# Patient Record
Sex: FEMALE | Race: WHITE | Employment: FULL TIME | URBAN - METROPOLITAN AREA
[De-identification: names, ages, dates, MRNs, and addresses within clinical notes are randomized per-mention and may not be internally consistent; named-entity substitution may affect disease eponyms.]

---

## 2017-02-13 ENCOUNTER — OFFICE VISIT (OUTPATIENT)
Dept: URGENT CARE | Facility: CLINIC | Age: 52
End: 2017-02-13
Payer: COMMERCIAL

## 2017-02-13 VITALS
HEART RATE: 54 BPM | BODY MASS INDEX: 23.23 KG/M2 | WEIGHT: 148 LBS | DIASTOLIC BLOOD PRESSURE: 68 MMHG | RESPIRATION RATE: 12 BRPM | TEMPERATURE: 98.1 F | OXYGEN SATURATION: 98 % | HEIGHT: 67 IN | SYSTOLIC BLOOD PRESSURE: 116 MMHG

## 2017-02-13 DIAGNOSIS — H65.93 MIDDLE EAR EFFUSION, BILATERAL: ICD-10-CM

## 2017-02-13 PROCEDURE — 99202 OFFICE O/P NEW SF 15 MIN: CPT | Performed by: NURSE PRACTITIONER

## 2017-02-13 RX ORDER — ESTRADIOL 0.05 MG/D
1 PATCH TRANSDERMAL
COMMUNITY
End: 2018-01-29

## 2017-02-13 ASSESSMENT — ENCOUNTER SYMPTOMS
FEVER: 0
WEAKNESS: 0
COUGH: 0
DIAPHORESIS: 0
HEADACHES: 0
DIZZINESS: 0
CHILLS: 0

## 2017-02-13 NOTE — MR AVS SNAPSHOT
"Lenka Moreno   2017 4:30 PM   Office Visit   MRN: [id number]    Department:  [facility]   Dept Phone:  [phone]    Description:  Female : 1965   Provider:  GAIL Lugo           Reason for Visit     Ear Fullness and pressure       Allergies as of 2017     No Known Allergies      You were diagnosed with     Middle ear effusion, bilateral   [[id number]]         Vital Signs     Blood Pressure Pulse Temperature Respirations Height Weight    116/68 mmHg 54 36.7 °C (98.1 °F) 12 1.702 m (5' 7\") 67.132 kg (148 lb)    Body Mass Index Oxygen Saturation Breastfeeding?             23.17 kg/m2 98% No         Basic Information     Date Of Birth Sex Race Ethnicity Preferred Language    1965 Female White Unknown English      Health Maintenance     Patient has no pending health maintenance at this time      Current Immunizations     No immunizations on file.      Below and/or attached are the medications your provider expects you to take. Review all of your home medications and newly ordered medications with your provider and/or pharmacist. Follow medication instructions as directed by your provider and/or pharmacist. Please keep your medication list with you and share with your provider. Update the information when medications are discontinued, doses are changed, or new medications (including over-the-counter products) are added; and carry medication information at all times in the event of emergency situations     Allergies:  No Known Allergies          Medications  Valid as of: 2017 -  5:25 PM    Generic Name Brand Name Tablet Size Instructions for use    Beclomethasone Dipropionate   Inhale  by mouth.        Estradiol (PATCH WEEKLY) CLIMARA 0.05 MG/24HR Apply 1 Patch to skin as directed every 7 days.        .                 Medicines prescribed today were sent to:     [id number] - [place], [place] - [address] [facility] AT [facility] " [address]    [address] [facility] [address]    Phone: [phone] Fax: [phone]    Open 24 Hours?: No      Medication refill instructions:       If your prescription bottle indicates you have medication refills left, it is not necessary to call your provider’s office. Please contact your pharmacy and they will refill your medication.    If your prescription bottle indicates you do not have any refills left, you may request refills at any time through one of the following ways: The online Accruit system (except Urgent Care), by calling your provider’s office, or by asking your pharmacy to contact your provider’s office with a refill request. Medication refills are processed only during regular business hours and may not be available until the next business day. Your provider may request additional information or to have a follow-up visit with you prior to refilling your medication.   *Please Note: Medication refills are assigned a new Rx number when refilled electronically. Your pharmacy may indicate that no refills were authorized even though a new prescription for the same medication is available at the pharmacy. Please request the medicine by name with the pharmacy before contacting your provider for a refill.           Accruit Access Code: Activation code not generated  Current Accruit Status: Active

## 2017-02-14 NOTE — PROGRESS NOTES
"Subjective:      Lenka Moreno is a 51 y.o. female who presents with Ear Fullness            Ear Fullness  Pertinent negatives include no chills, congestion, coughing, diaphoresis, fever, headaches, rash or weakness.   Patient comes in today with a 10 day history of intermittent ear fullness and otalgia.  She denies any nasal congestion, sore throat, or cough.  She has not taken any medication to treat the symptoms.  Inverting the head seems to relieve the symptoms temporarily.  No prior history of similar symptoms.      Review of Systems   Constitutional: Negative for fever, chills, malaise/fatigue and diaphoresis.   HENT: Positive for ear pain. Negative for congestion, ear discharge, hearing loss and tinnitus.    Respiratory: Negative for cough.    Skin: Negative for rash.   Neurological: Negative for dizziness, weakness and headaches.     Medications, Allergies, and current problem list reviewed today in Epic     Objective:     /68 mmHg  Pulse 54  Temp(Src) 36.7 °C (98.1 °F)  Resp 12  Ht 1.702 m (5' 7\")  Wt 67.132 kg (148 lb)  BMI 23.17 kg/m2  SpO2 98%  Breastfeeding? No     Physical Exam   Constitutional: She is oriented to person, place, and time. She appears well-developed and well-nourished. No distress.   HENT:   Head: Normocephalic.   Nose: Nose normal.   Mouth/Throat: Oropharynx is clear and moist. No oropharyngeal exudate.   Bilateral clear middle ear effusion with no erythema, purulence, retraction, or bulge.  No pinna or tragus tenderness.  No mastoid swelling or tenderness.   Eyes: Conjunctivae are normal. Pupils are equal, round, and reactive to light. Right eye exhibits no discharge. Left eye exhibits no discharge. No scleral icterus.   Neck: Neck supple. No JVD present. No tracheal deviation present. No thyromegaly present.   Cardiovascular: Normal rate, regular rhythm and normal heart sounds.  Exam reveals no gallop and no friction rub.    No murmur heard.  Pulmonary/Chest: Effort " normal and breath sounds normal. No stridor. No respiratory distress. She has no wheezes. She has no rales. She exhibits no tenderness.   Lymphadenopathy:     She has no cervical adenopathy.   Neurological: She is alert and oriented to person, place, and time.   Skin: Skin is warm and dry. No rash noted. She is not diaphoretic. No erythema.   Vitals reviewed.              Assessment/Plan:     1. Middle ear effusion, bilateral    Discussed exam findings with patient.  Trial Sudafed for 5-7 days and Flonase for 2-3 weeks.  RTC or see PCP in 3-4 weeks for any persistent symptoms or sooner for worsening symptoms.  Patient verbalized understanding of and agreed with plan of care.

## 2017-03-09 ENCOUNTER — RX ONLY (OUTPATIENT)
Age: 52
Setting detail: RX ONLY
End: 2017-03-09

## 2017-04-16 ENCOUNTER — OCCUPATIONAL MEDICINE (OUTPATIENT)
Dept: URGENT CARE | Facility: CLINIC | Age: 52
End: 2017-04-16
Payer: COMMERCIAL

## 2017-04-16 VITALS
DIASTOLIC BLOOD PRESSURE: 80 MMHG | SYSTOLIC BLOOD PRESSURE: 112 MMHG | OXYGEN SATURATION: 99 % | HEART RATE: 50 BPM | WEIGHT: 148 LBS | HEIGHT: 67 IN | BODY MASS INDEX: 23.23 KG/M2 | TEMPERATURE: 98.1 F | RESPIRATION RATE: 16 BRPM

## 2017-04-16 DIAGNOSIS — S16.1XXA NECK STRAIN, INITIAL ENCOUNTER: ICD-10-CM
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
## 2017-04-16 DIAGNOSIS — S70.02XA CONTUSION OF LEFT HIP, INITIAL ENCOUNTER: ICD-10-CM

## 2017-04-16 DIAGNOSIS — M54.2 NECK PAIN ON RIGHT SIDE: ICD-10-CM

## 2017-04-16 PROCEDURE — 99214 OFFICE O/P EST MOD 30 MIN: CPT | Mod: 29 | Performed by: NURSE PRACTITIONER

## 2017-04-16 ASSESSMENT — ENCOUNTER SYMPTOMS
WEAKNESS: 0
FEVER: 0
SENSORY CHANGE: 0
FOCAL WEAKNESS: 0
TINGLING: 0
NECK PAIN: 1
DIAPHORESIS: 0
FALLS: 0
CHILLS: 0

## 2017-04-16 ASSESSMENT — PAIN SCALES - GENERAL: PAINLEVEL: 3=SLIGHT PAIN
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
## 2018-01-29 ENCOUNTER — OFFICE VISIT (OUTPATIENT)
Dept: MEDICAL GROUP | Facility: PHYSICIAN GROUP | Age: 53
End: 2018-01-29
Payer: COMMERCIAL

## 2018-01-29 VITALS
OXYGEN SATURATION: 97 % | HEIGHT: 67 IN | BODY MASS INDEX: 24.8 KG/M2 | DIASTOLIC BLOOD PRESSURE: 78 MMHG | WEIGHT: 158 LBS | SYSTOLIC BLOOD PRESSURE: 110 MMHG | RESPIRATION RATE: 16 BRPM | HEART RATE: 57 BPM | TEMPERATURE: 98.2 F

## 2018-01-29 DIAGNOSIS — J45.40 MODERATE PERSISTENT ASTHMA WITHOUT COMPLICATION: ICD-10-CM

## 2018-01-29 DIAGNOSIS — Z12.11 COLON CANCER SCREENING: ICD-10-CM

## 2018-01-29 DIAGNOSIS — Z00.00 ANNUAL PHYSICAL EXAM: ICD-10-CM

## 2018-01-29 DIAGNOSIS — Z12.31 ENCOUNTER FOR SCREENING MAMMOGRAM FOR BREAST CANCER: ICD-10-CM

## 2018-01-29 PROCEDURE — 99203 OFFICE O/P NEW LOW 30 MIN: CPT | Performed by: NURSE PRACTITIONER

## 2018-01-29 RX ORDER — ESTRADIOL 0.5 MG/1
TABLET ORAL
Refills: 0 | COMMUNITY
Start: 2018-01-16 | End: 2018-04-30 | Stop reason: SDUPTHER

## 2018-01-29 RX ORDER — ALBUTEROL SULFATE 90 UG/1
1-2 AEROSOL, METERED RESPIRATORY (INHALATION) EVERY 4 HOURS PRN
Qty: 1 INHALER | Refills: 3 | Status: SHIPPED | OUTPATIENT
Start: 2018-01-29 | End: 2019-04-15 | Stop reason: SDUPTHER

## 2018-01-29 ASSESSMENT — PATIENT HEALTH QUESTIONNAIRE - PHQ9: CLINICAL INTERPRETATION OF PHQ2 SCORE: 0

## 2018-01-29 NOTE — PROGRESS NOTES
"Subjective:   Lenka Moreno is a 52 y.o. female here today for ***    No problem-specific Assessment & Plan notes found for this encounter.     ***  Health Maintenance Due   Topic Date Due   • PAP SMEAR  12/05/1984   • COLONOSCOPY  12/05/2013   • MAMMOGRAM  08/17/2017   • IMM INFLUENZA (1) 09/01/2017       Current medicines (including changes today)  Current Outpatient Prescriptions   Medication Sig Dispense Refill   • Albuterol (VENTOLIN INH) Inhale  by mouth.     • estradiol (ESTRACE) 0.5 MG tablet TK 1 T PO  QD  0   • ibuprofen (MOTRIN) 100 MG/5ML Suspension Take 10 mg/kg by mouth every 6 hours as needed.     • Beclomethasone Dipropionate (QVAR INH) Inhale  by mouth.       No current facility-administered medications for this visit.      She  has no past medical history of Breast cancer (CMS-[facility]).    Social History     Social History   • Marital status:      Spouse name: N/A   • Number of children: N/A   • Years of education: N/A     Occupational History   • Not on file.     Social History Main Topics   • Smoking status: Never Smoker   • Smokeless tobacco: Never Used   • Alcohol use 4.2 oz/week     7 Glasses of wine per week      Comment: one glass of wine a night    • Drug use: No   • Sexual activity: No     Other Topics Concern   • Not on file     Social History Narrative   • No narrative on file       ROS           Objective:     Pulse (!) 57, temperature 36.8 °C (98.2 °F), resp. rate 16, height 1.702 m (5' 7.01\"), weight 71.7 kg (158 lb), SpO2 97 %. Body mass index is 24.74 kg/m². ***    Physical Exam      Assessment and Plan:   The following treatment plan was discussed    There are no diagnoses linked to this encounter.    Followup: No Follow-up on file.         "

## 2018-01-30 PROBLEM — J45.40 MODERATE PERSISTENT ASTHMA WITHOUT COMPLICATION: Status: ACTIVE | Noted: 2018-01-30

## 2018-01-30 ASSESSMENT — ENCOUNTER SYMPTOMS
SORE THROAT: 0
DIARRHEA: 0
BLURRED VISION: 0
PALPITATIONS: 0
BLOOD IN STOOL: 0
VOMITING: 0
DIZZINESS: 0
ABDOMINAL PAIN: 0
DEPRESSION: 0
HEMOPTYSIS: 0
WHEEZING: 0
DIAPHORESIS: 0
SINUS PAIN: 0
DYSPNEA ON EXERTION: 0
HEADACHES: 0
CHILLS: 0
FEVER: 0
SPUTUM PRODUCTION: 0
SHORTNESS OF BREATH: 1
CONSTIPATION: 0
COUGH: 1
DIFFICULTY BREATHING: 0
NAUSEA: 0
CHEST TIGHTNESS: 1
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
## 2018-02-06 ENCOUNTER — HOSPITAL ENCOUNTER (OUTPATIENT)
Facility: MEDICAL CENTER | Age: 53
End: 2018-02-06
Attending: NURSE PRACTITIONER
Payer: COMMERCIAL

## 2018-02-06 PROCEDURE — 82274 ASSAY TEST FOR BLOOD FECAL: CPT

## 2018-02-08 ENCOUNTER — HOSPITAL ENCOUNTER (OUTPATIENT)
Dept: LAB | Facility: MEDICAL CENTER | Age: 53
End: 2018-02-08
Attending: NURSE PRACTITIONER
Payer: COMMERCIAL

## 2018-02-08 DIAGNOSIS — Z00.00 ANNUAL PHYSICAL EXAM: ICD-10-CM

## 2018-02-08 LAB
25(OH)D3 SERPL-MCNC: 14 NG/ML (ref 30–100)
ALBUMIN SERPL BCP-MCNC: 4 G/DL (ref 3.2–4.9)
ALBUMIN/GLOB SERPL: 1.5 G/DL
ALP SERPL-CCNC: 51 U/L (ref 30–99)
ALT SERPL-CCNC: 11 U/L (ref 2–50)
ANION GAP SERPL CALC-SCNC: 8 MMOL/L (ref 0–11.9)
AST SERPL-CCNC: 15 U/L (ref 12–45)
BILIRUB SERPL-MCNC: 0.4 MG/DL (ref 0.1–1.5)
BUN SERPL-MCNC: 13 MG/DL (ref 8–22)
CALCIUM SERPL-MCNC: 9.1 MG/DL (ref 8.5–10.5)
CHLORIDE SERPL-SCNC: 104 MMOL/L (ref 96–112)
CHOLEST SERPL-MCNC: 208 MG/DL (ref 100–199)
CO2 SERPL-SCNC: 27 MMOL/L (ref 20–33)
CREAT SERPL-MCNC: 0.86 MG/DL (ref 0.5–1.4)
ERYTHROCYTE [DISTWIDTH] IN BLOOD BY AUTOMATED COUNT: 44 FL (ref 35.9–50)
EST. AVERAGE GLUCOSE BLD GHB EST-MCNC: 108 MG/DL
GLOBULIN SER CALC-MCNC: 2.6 G/DL (ref 1.9–3.5)
GLUCOSE SERPL-MCNC: 62 MG/DL (ref 65–99)
HBA1C MFR BLD: 5.4 % (ref 0–5.6)
HCT VFR BLD AUTO: 41.6 % (ref 37–47)
HDLC SERPL-MCNC: 80 MG/DL
HGB BLD-MCNC: 12.9 G/DL (ref 12–16)
LDLC SERPL CALC-MCNC: 112 MG/DL
MCH RBC QN AUTO: 28.9 PG (ref 27–33)
MCHC RBC AUTO-ENTMCNC: 31 G/DL (ref 33.6–35)
MCV RBC AUTO: 93.1 FL (ref 81.4–97.8)
PLATELET # BLD AUTO: 237 K/UL (ref 164–446)
PMV BLD AUTO: 14.6 FL (ref 9–12.9)
POTASSIUM SERPL-SCNC: 4.1 MMOL/L (ref 3.6–5.5)
PROT SERPL-MCNC: 6.6 G/DL (ref 6–8.2)
RBC # BLD AUTO: 4.47 M/UL (ref 4.2–5.4)
SODIUM SERPL-SCNC: 139 MMOL/L (ref 135–145)
T4 FREE SERPL-MCNC: 0.79 NG/DL (ref 0.53–1.43)
TRIGL SERPL-MCNC: 81 MG/DL (ref 0–149)
TSH SERPL DL<=0.005 MIU/L-ACNC: 1.49 UIU/ML (ref 0.38–5.33)
WBC # BLD AUTO: 5.1 K/UL (ref 4.8–10.8)

## 2018-02-08 PROCEDURE — 84443 ASSAY THYROID STIM HORMONE: CPT

## 2018-02-08 PROCEDURE — 36415 COLL VENOUS BLD VENIPUNCTURE: CPT

## 2018-02-08 PROCEDURE — 83036 HEMOGLOBIN GLYCOSYLATED A1C: CPT

## 2018-02-08 PROCEDURE — 80053 COMPREHEN METABOLIC PANEL: CPT

## 2018-02-08 PROCEDURE — 82306 VITAMIN D 25 HYDROXY: CPT

## 2018-02-08 PROCEDURE — 85027 COMPLETE CBC AUTOMATED: CPT

## 2018-02-08 PROCEDURE — 80061 LIPID PANEL: CPT

## 2018-02-08 PROCEDURE — 84439 ASSAY OF FREE THYROXINE: CPT

## 2018-02-13 DIAGNOSIS — Z12.11 COLON CANCER SCREENING: ICD-10-CM

## 2018-02-14 LAB — HEMOCCULT STL QL IA: NEGATIVE

## 2018-02-16 ENCOUNTER — TELEPHONE (OUTPATIENT)
Dept: MEDICAL GROUP | Facility: PHYSICIAN GROUP | Age: 53
End: 2018-02-16

## 2018-02-16 NOTE — TELEPHONE ENCOUNTER
----- Message from GAIL Brown sent at 2/15/2018  7:33 PM PST -----  FIT test negative for blood. Routine screening recommended in one year. Please updated HM.    GAIL Brown,DANITZAP-C

## 2018-03-23 ENCOUNTER — TELEPHONE (OUTPATIENT)
Dept: MEDICAL GROUP | Facility: PHYSICIAN GROUP | Age: 53
End: 2018-03-23

## 2018-03-23 RX ORDER — FLUTICASONE PROPIONATE 110 UG/1
2 AEROSOL, METERED RESPIRATORY (INHALATION) 2 TIMES DAILY
Qty: 1 INHALER | Refills: 2 | Status: SHIPPED | OUTPATIENT
Start: 2018-03-23 | End: 2018-06-18 | Stop reason: SDUPTHER

## 2018-03-23 NOTE — TELEPHONE ENCOUNTER
Phone Number Called: [phone] (home)       Message: Left message for pt to notify her about the new medication.     Left Message for patient to call back: only if she has questions.

## 2018-03-23 NOTE — TELEPHONE ENCOUNTER
Pt called and stated that Gustavo told her that QVAR is no longer being made. She would like to know if she can get an alternative sent over to the pharmacy.

## 2018-03-29 ENCOUNTER — OFFICE VISIT (OUTPATIENT)
Dept: MEDICAL GROUP | Facility: PHYSICIAN GROUP | Age: 53
End: 2018-03-29
Payer: COMMERCIAL

## 2018-03-29 VITALS
WEIGHT: 153.6 LBS | DIASTOLIC BLOOD PRESSURE: 82 MMHG | TEMPERATURE: 98.1 F | HEIGHT: 67 IN | SYSTOLIC BLOOD PRESSURE: 108 MMHG | OXYGEN SATURATION: 99 % | BODY MASS INDEX: 24.11 KG/M2 | RESPIRATION RATE: 18 BRPM | HEART RATE: 61 BPM

## 2018-03-29 DIAGNOSIS — J45.50 SEVERE PERSISTENT ASTHMA WITHOUT COMPLICATION: ICD-10-CM

## 2018-03-29 DIAGNOSIS — Z23 NEED FOR VACCINATION: ICD-10-CM

## 2018-03-29 PROCEDURE — 90732 PPSV23 VACC 2 YRS+ SUBQ/IM: CPT | Performed by: NURSE PRACTITIONER

## 2018-03-29 PROCEDURE — 99213 OFFICE O/P EST LOW 20 MIN: CPT | Mod: 25 | Performed by: NURSE PRACTITIONER

## 2018-03-29 PROCEDURE — 90471 IMMUNIZATION ADMIN: CPT | Performed by: NURSE PRACTITIONER

## 2018-03-29 ASSESSMENT — ENCOUNTER SYMPTOMS
COUGH: 0
CHILLS: 0
SORE THROAT: 0
HEMOPTYSIS: 0
FEVER: 0
BLURRED VISION: 0
PALPITATIONS: 0
DIFFICULTY BREATHING: 0
CHEST TIGHTNESS: 0
SHORTNESS OF BREATH: 0
SPUTUM PRODUCTION: 0
DIAPHORESIS: 0
WHEEZING: 0
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
## 2018-04-30 ENCOUNTER — OFFICE VISIT (OUTPATIENT)
Dept: MEDICAL GROUP | Facility: PHYSICIAN GROUP | Age: 53
End: 2018-04-30
Payer: COMMERCIAL

## 2018-04-30 VITALS
SYSTOLIC BLOOD PRESSURE: 100 MMHG | BODY MASS INDEX: 24.33 KG/M2 | HEART RATE: 59 BPM | HEIGHT: 67 IN | OXYGEN SATURATION: 98 % | TEMPERATURE: 98.5 F | DIASTOLIC BLOOD PRESSURE: 60 MMHG | RESPIRATION RATE: 16 BRPM | WEIGHT: 155 LBS

## 2018-04-30 DIAGNOSIS — N95.1 POST MENOPAUSAL SYNDROME: ICD-10-CM

## 2018-04-30 DIAGNOSIS — J45.40 MODERATE PERSISTENT ASTHMA WITHOUT COMPLICATION: ICD-10-CM

## 2018-04-30 PROCEDURE — 99214 OFFICE O/P EST MOD 30 MIN: CPT | Performed by: NURSE PRACTITIONER

## 2018-04-30 RX ORDER — ESTRADIOL 0.5 MG/1
0.5 TABLET ORAL DAILY
Qty: 60 TAB | Refills: 0 | Status: SHIPPED | OUTPATIENT
Start: 2018-04-30 | End: 2018-06-27 | Stop reason: SDUPTHER

## 2018-04-30 ASSESSMENT — ENCOUNTER SYMPTOMS
DIZZINESS: 0
WEIGHT LOSS: 0
SHORTNESS OF BREATH: 0
PALPITATIONS: 0
DIAPHORESIS: 0
FEVER: 0
WEAKNESS: 0
CHILLS: 0
SPUTUM PRODUCTION: 0
COUGH: 0
WHEEZING: 0
CHEST TIGHTNESS: 0

## 2018-04-30 NOTE — ASSESSMENT & PLAN NOTE
This a chronic issue that began following hysterectomy over 10 years ago. Associated symptoms include hot flashes. Her symptoms have been controlled with HRT, which she has been taking for the past 3-4 years. She is requesting a refill on medication today. Her last mammogram was one year ago and normal.
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
## 2018-05-01 ENCOUNTER — HOSPITAL ENCOUNTER (OUTPATIENT)
Dept: RADIOLOGY | Facility: MEDICAL CENTER | Age: 53
End: 2018-05-01
Attending: NURSE PRACTITIONER
Payer: COMMERCIAL

## 2018-05-01 DIAGNOSIS — Z12.31 ENCOUNTER FOR SCREENING MAMMOGRAM FOR BREAST CANCER: ICD-10-CM

## 2018-05-01 PROCEDURE — 77067 SCR MAMMO BI INCL CAD: CPT

## 2018-05-07 ENCOUNTER — HOSPITAL ENCOUNTER (OUTPATIENT)
Dept: RADIOLOGY | Facility: MEDICAL CENTER | Age: 53
End: 2018-05-07
Attending: NURSE PRACTITIONER
Payer: COMMERCIAL

## 2018-05-07 DIAGNOSIS — R92.8 ABNORMAL MAMMOGRAM: ICD-10-CM

## 2018-05-07 PROCEDURE — 77065 DX MAMMO INCL CAD UNI: CPT | Mod: LT

## 2018-05-07 PROCEDURE — 76642 ULTRASOUND BREAST LIMITED: CPT | Mod: LT

## 2018-09-04 DIAGNOSIS — J45.50 SEVERE PERSISTENT ASTHMA WITHOUT COMPLICATION: ICD-10-CM

## 2018-09-04 RX ORDER — DEXAMETHASONE 4 MG/1
TABLET ORAL
Qty: 12 G | Refills: 0 | Status: SHIPPED | OUTPATIENT
Start: 2018-09-04 | End: 2018-10-02 | Stop reason: SDUPTHER

## 2018-09-27 DIAGNOSIS — N95.1 POST MENOPAUSAL SYNDROME: ICD-10-CM

## 2018-09-27 RX ORDER — ESTRADIOL 0.5 MG/1
TABLET ORAL
Qty: 90 TAB | Refills: 0 | Status: SHIPPED | OUTPATIENT
Start: 2018-09-27 | End: 2019-01-23 | Stop reason: SDUPTHER

## 2018-09-27 NOTE — TELEPHONE ENCOUNTER
Was the patient seen in the last year in this department? Yes    Does patient have an active prescription for medications requested? Yes    Received Request Via: Pharmacy        Last visit: 4/30/18  Last labs:2/8/18

## 2019-01-10 DIAGNOSIS — N95.1 POST MENOPAUSAL SYNDROME: ICD-10-CM

## 2019-01-10 DIAGNOSIS — J45.50 SEVERE PERSISTENT ASTHMA WITHOUT COMPLICATION: ICD-10-CM

## 2019-01-11 RX ORDER — ESTRADIOL 0.5 MG/1
TABLET ORAL
Qty: 90 TAB | Refills: 0 | OUTPATIENT
Start: 2019-01-11

## 2019-01-11 RX ORDER — DEXAMETHASONE 4 MG/1
TABLET ORAL
Qty: 12 G | Refills: 0 | OUTPATIENT
Start: 2019-01-11

## 2019-01-23 ENCOUNTER — OFFICE VISIT (OUTPATIENT)
Dept: MEDICAL GROUP | Facility: PHYSICIAN GROUP | Age: 54
End: 2019-01-23
Payer: COMMERCIAL

## 2019-01-23 VITALS
WEIGHT: 168.7 LBS | DIASTOLIC BLOOD PRESSURE: 80 MMHG | TEMPERATURE: 97.6 F | HEART RATE: 66 BPM | OXYGEN SATURATION: 96 % | BODY MASS INDEX: 26.48 KG/M2 | SYSTOLIC BLOOD PRESSURE: 100 MMHG | HEIGHT: 67 IN | RESPIRATION RATE: 18 BRPM

## 2019-01-23 DIAGNOSIS — N95.1 POST MENOPAUSAL SYNDROME: ICD-10-CM

## 2019-01-23 DIAGNOSIS — J45.40 MODERATE PERSISTENT ASTHMA WITHOUT COMPLICATION: ICD-10-CM

## 2019-01-23 DIAGNOSIS — Z00.00 ANNUAL PHYSICAL EXAM: ICD-10-CM

## 2019-01-23 PROCEDURE — 99214 OFFICE O/P EST MOD 30 MIN: CPT | Performed by: NURSE PRACTITIONER

## 2019-01-23 RX ORDER — FLUTICASONE PROPIONATE 110 UG/1
2 AEROSOL, METERED RESPIRATORY (INHALATION) 2 TIMES DAILY
Qty: 12 G | Refills: 2 | Status: SHIPPED | OUTPATIENT
Start: 2019-01-23 | End: 2019-04-22 | Stop reason: SDUPTHER

## 2019-01-23 RX ORDER — ESTRADIOL 0.5 MG/1
0.5 TABLET ORAL
Qty: 90 TAB | Refills: 1 | Status: SHIPPED | OUTPATIENT
Start: 2019-01-23 | End: 2020-06-25 | Stop reason: SDUPTHER

## 2019-01-23 ASSESSMENT — ENCOUNTER SYMPTOMS
SHORTNESS OF BREATH: 0
WHEEZING: 0
CHILLS: 0
CHEST TIGHTNESS: 0
COUGH: 1
FEVER: 0
DIFFICULTY BREATHING: 0
SPUTUM PRODUCTION: 0

## 2019-01-23 ASSESSMENT — PATIENT HEALTH QUESTIONNAIRE - PHQ9: CLINICAL INTERPRETATION OF PHQ2 SCORE: 0
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
## 2019-01-24 NOTE — ASSESSMENT & PLAN NOTE
Chronic in nature. Onset began over 10 years ago. Associated symptoms include hot flashes. Her symptoms are well controlled with estradiol 0.5 mg daily. She is requesting a refill of medication. She is UTD on yearly mammogram.

## 2019-01-29 ENCOUNTER — HOSPITAL ENCOUNTER (OUTPATIENT)
Dept: LAB | Facility: MEDICAL CENTER | Age: 54
End: 2019-01-29
Attending: NURSE PRACTITIONER
Payer: COMMERCIAL

## 2019-01-29 DIAGNOSIS — Z00.00 ANNUAL PHYSICAL EXAM: ICD-10-CM

## 2019-01-29 LAB
25(OH)D3 SERPL-MCNC: 19 NG/ML (ref 30–100)
ALBUMIN SERPL BCP-MCNC: 4.1 G/DL (ref 3.2–4.9)
ALBUMIN/GLOB SERPL: 1.5 G/DL
ALP SERPL-CCNC: 58 U/L (ref 30–99)
ALT SERPL-CCNC: 13 U/L (ref 2–50)
ANION GAP SERPL CALC-SCNC: 8 MMOL/L (ref 0–11.9)
AST SERPL-CCNC: 17 U/L (ref 12–45)
BILIRUB SERPL-MCNC: 0.5 MG/DL (ref 0.1–1.5)
BUN SERPL-MCNC: 18 MG/DL (ref 8–22)
CALCIUM SERPL-MCNC: 9.4 MG/DL (ref 8.5–10.5)
CHLORIDE SERPL-SCNC: 105 MMOL/L (ref 96–112)
CHOLEST SERPL-MCNC: 261 MG/DL (ref 100–199)
CO2 SERPL-SCNC: 27 MMOL/L (ref 20–33)
CREAT SERPL-MCNC: 0.99 MG/DL (ref 0.5–1.4)
EST. AVERAGE GLUCOSE BLD GHB EST-MCNC: 111 MG/DL
FASTING STATUS PATIENT QL REPORTED: NORMAL
GLOBULIN SER CALC-MCNC: 2.7 G/DL (ref 1.9–3.5)
GLUCOSE SERPL-MCNC: 85 MG/DL (ref 65–99)
HBA1C MFR BLD: 5.5 % (ref 0–5.6)
HDLC SERPL-MCNC: 79 MG/DL
LDLC SERPL CALC-MCNC: 166 MG/DL
POTASSIUM SERPL-SCNC: 4.9 MMOL/L (ref 3.6–5.5)
PROT SERPL-MCNC: 6.8 G/DL (ref 6–8.2)
SODIUM SERPL-SCNC: 140 MMOL/L (ref 135–145)
TRIGL SERPL-MCNC: 78 MG/DL (ref 0–149)

## 2019-01-29 PROCEDURE — 82306 VITAMIN D 25 HYDROXY: CPT

## 2019-01-29 PROCEDURE — 80053 COMPREHEN METABOLIC PANEL: CPT

## 2019-01-29 PROCEDURE — 36415 COLL VENOUS BLD VENIPUNCTURE: CPT

## 2019-01-29 PROCEDURE — 80061 LIPID PANEL: CPT

## 2019-01-29 PROCEDURE — 83036 HEMOGLOBIN GLYCOSYLATED A1C: CPT

## 2019-04-15 DIAGNOSIS — J45.40 MODERATE PERSISTENT ASTHMA WITHOUT COMPLICATION: ICD-10-CM

## 2019-04-15 RX ORDER — ALBUTEROL SULFATE 90 UG/1
AEROSOL, METERED RESPIRATORY (INHALATION)
Qty: 8.5 G | Refills: 0 | Status: CANCELLED | OUTPATIENT
Start: 2019-04-15

## 2019-04-15 RX ORDER — ALBUTEROL SULFATE 90 UG/1
1-2 AEROSOL, METERED RESPIRATORY (INHALATION) EVERY 4 HOURS PRN
Qty: 8.5 G | Refills: 1 | Status: SHIPPED | OUTPATIENT
Start: 2019-04-15 | End: 2021-02-03

## 2019-04-15 RX ORDER — ALBUTEROL SULFATE 90 UG/1
AEROSOL, METERED RESPIRATORY (INHALATION)
Qty: 8.5 G | Refills: 0 | Status: SHIPPED | OUTPATIENT
Start: 2019-04-15 | End: 2019-09-04 | Stop reason: SDUPTHER

## 2019-04-22 DIAGNOSIS — J45.40 MODERATE PERSISTENT ASTHMA WITHOUT COMPLICATION: ICD-10-CM

## 2019-04-23 RX ORDER — DEXAMETHASONE 4 MG/1
TABLET ORAL
Qty: 12 G | Refills: 0 | Status: SHIPPED | OUTPATIENT
Start: 2019-04-23 | End: 2019-09-03 | Stop reason: SDUPTHER

## 2019-09-03 DIAGNOSIS — J45.40 MODERATE PERSISTENT ASTHMA WITHOUT COMPLICATION: ICD-10-CM

## 2019-09-03 RX ORDER — DEXAMETHASONE 4 MG/1
TABLET ORAL
Qty: 12 G | Refills: 0 | Status: SHIPPED | OUTPATIENT
Start: 2019-09-03

## 2019-09-04 DIAGNOSIS — J45.40 MODERATE PERSISTENT ASTHMA WITHOUT COMPLICATION: ICD-10-CM

## 2019-09-04 RX ORDER — ALBUTEROL SULFATE 90 UG/1
AEROSOL, METERED RESPIRATORY (INHALATION)
Qty: 8.5 G | Refills: 0 | Status: SHIPPED | OUTPATIENT
Start: 2019-09-04 | End: 2019-09-24 | Stop reason: SDUPTHER

## 2019-09-24 DIAGNOSIS — J45.40 MODERATE PERSISTENT ASTHMA WITHOUT COMPLICATION: ICD-10-CM

## 2019-09-24 RX ORDER — ALBUTEROL SULFATE 90 UG/1
AEROSOL, METERED RESPIRATORY (INHALATION)
Qty: 8.5 G | Refills: 2 | Status: SHIPPED | OUTPATIENT
Start: 2019-09-24

## 2020-06-25 DIAGNOSIS — N95.1 POST MENOPAUSAL SYNDROME: ICD-10-CM

## 2020-06-25 RX ORDER — ESTRADIOL 0.5 MG/1
0.5 TABLET ORAL
Qty: 90 TAB | Refills: 0 | Status: SHIPPED | OUTPATIENT
Start: 2020-06-25 | End: 2021-02-03

## 2020-08-10 DIAGNOSIS — J45.40 MODERATE PERSISTENT ASTHMA WITHOUT COMPLICATION: ICD-10-CM

## 2020-08-10 RX ORDER — DEXAMETHASONE 4 MG/1
TABLET ORAL
Qty: 12 G | Refills: 0 | OUTPATIENT
Start: 2020-08-10

## 2021-02-03 ENCOUNTER — OFFICE VISIT (OUTPATIENT)
Dept: MEDICAL GROUP | Facility: MEDICAL CENTER | Age: 56
End: 2021-02-03
Payer: COMMERCIAL

## 2021-02-03 VITALS
BODY MASS INDEX: 26.37 KG/M2 | SYSTOLIC BLOOD PRESSURE: 104 MMHG | HEART RATE: 52 BPM | WEIGHT: 174 LBS | OXYGEN SATURATION: 98 % | DIASTOLIC BLOOD PRESSURE: 78 MMHG | HEIGHT: 68 IN | TEMPERATURE: 97.3 F

## 2021-02-03 DIAGNOSIS — N95.1 POST MENOPAUSAL SYNDROME: ICD-10-CM

## 2021-02-03 DIAGNOSIS — J45.40 MODERATE PERSISTENT ASTHMA WITHOUT COMPLICATION: ICD-10-CM

## 2021-02-03 PROCEDURE — 99204 OFFICE O/P NEW MOD 45 MIN: CPT | Performed by: INTERNAL MEDICINE

## 2021-02-03 RX ORDER — CETIRIZINE HYDROCHLORIDE 10 MG/1
10 TABLET ORAL PRN
COMMUNITY

## 2021-02-03 ASSESSMENT — PATIENT HEALTH QUESTIONNAIRE - PHQ9: CLINICAL INTERPRETATION OF PHQ2 SCORE: 0

## 2021-02-03 NOTE — LETTER
[facility]  Mia Graham M.D.  [address]  [place] [address]  Fax: [phone]   Authorization for Release/Disclosure of   Protected Health Information   Name: LENKA HYLTON : 1965 SSN: [id number]   Address: [address] [place] [address] [address]  [place] [address] Phone:    [phone] (home)    I authorize the entity listed below to release/disclose the PHI below to:   [facility]/Mia Graham M.D. and Mia Graham M.D.   Provider or Entity Name:  [facility]   Address   City, State, Zip   Phone:      Fax:     Reason for request: continuity of care   Information to be released:    [  ] LAST COLONOSCOPY,  including any PATH REPORT and follow-up  [  ] LAST FIT/COLOGUARD RESULT [  ] LAST DEXA  [  ] LAST MAMMOGRAM  [  ] LAST PAP  [  ] LAST LABS [  ] RETINA EXAM REPORT  [  ] IMMUNIZATION RECORDS  [ XXXX ] Release all info      [  ] Check here and initial the line next to each item to release ALL health information INCLUDING  _____ Care and treatment for drug and / or alcohol abuse  _____ HIV testing, infection status, or AIDS  _____ Genetic Testing    DATES OF SERVICE OR TIME PERIOD TO BE DISCLOSED: _____________  I understand and acknowledge that:  * This Authorization may be revoked at any time by you in writing, except if your health information has already been used or disclosed.  * Your health information that will be used or disclosed as a result of you signing this authorization could be re-disclosed by the recipient. If this occurs, your re-disclosed health information may no longer be protected by State or Federal laws.  * You may refuse to sign this Authorization. Your refusal will not affect your ability to obtain treatment.  * This Authorization becomes effective upon signing and will  on (date) __________.      If no date is indicated, this Authorization will  one (1) year from the signature date.    Name: Lenka Hylton    Signature:   Date:     2/3/2021            PLEASE FAX REQUESTED RECORDS BACK TO: [phone]
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
## 2021-02-03 NOTE — ASSESSMENT & PLAN NOTE
Hot flashes and wt gain about 10 years ago, tried homeopathic, off estrogen for a few times, would be initially be fine and became symptomatic later. Off and on x 10 years HRT.